# Patient Record
Sex: FEMALE | URBAN - METROPOLITAN AREA
[De-identification: names, ages, dates, MRNs, and addresses within clinical notes are randomized per-mention and may not be internally consistent; named-entity substitution may affect disease eponyms.]

---

## 2017-06-21 ENCOUNTER — IMPORTED ENCOUNTER (OUTPATIENT)
Dept: URBAN - METROPOLITAN AREA CLINIC 9 | Facility: CLINIC | Age: 57
End: 2017-06-21

## 2019-01-25 NOTE — PATIENT DISCUSSION
CATARACTS, OU- VISUALLY SIGNIFICANT. PT ELECTS TO PROCEED WITH SURGERY. SCHEDULE OD EYE FIRST THEN LATER IN THE OS EYE IF VISUAL SYMPTOMS PERSIST.

## 2019-01-25 NOTE — PATIENT DISCUSSION
Gave patient trial lens OD -1.50-0.75x100 Acuvue Oasys for Astigmatism just to try. 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

## 2019-01-31 NOTE — PATIENT DISCUSSION
RIGHT EYE CATARACT SURGERY - PRE-OP INSTRUCTIONS:  I have reviewed the indications, alternatives, and risks of the procedure with the patient. These risks include partial or total loss of vision, infection in the eye, pain, and the need for additional surgery for complications including a broken posterior capsule, inability to completely remove the cataract, dropped nucleus, infection, or retinal detachment. The patient has given informed consent by signing the standard consent form. We will proceed with cataract surgery as planned. The patient has also been instructed in the usual pre-operative regimen including the need to use Prolensa and Besivance 3 days prior to surgery or the alternatives of Ketorolac and Ofloxacin, and the need to avoid eating or drinking anything except prescription medications after midnight on the morning of surgery. The patient was given the pre-printed Cataract Surgery Instructions handout. The contents were carefully reviewed with the patient.

## 2019-01-31 NOTE — PATIENT DISCUSSION
Surgery Counseling:  I have discussed the option of glasses versus cataract surgery versus following, It was explained that when vision no longer meets the patient's visual needs and a new prescription for glasses is not likely to improve the patient's visual symptoms, the option of cataract surgery is a reasonable next step. It was explained that there is no guarantee that removing the cataract will improve their visual symptoms; however, it is believed that the cataract is contributing to the patient's visual impairment and surgery may noticeably improve both the visual and functional status of the patient. After this discussion, the patient desires to proceed with cataract surgery with implantation of an intraocular lens to improve their vision for DISTANCE. I have prescribed Prolensa, Besivance, and Durezol to use as directed. Approved substitution of generics are Ofloxacin, Ketorolac, and Prednisolone.

## 2019-02-12 NOTE — PATIENT DISCUSSION
Continue: prednisolone acetate (prednisolone acetate): drops,suspension: 1% 1 drop four times a day into right eye 01-

## 2019-02-19 NOTE — PATIENT DISCUSSION
Pre-Op 2nd Eye Counseling: The patient has noticed an improvement in their visual symptoms in the operative eye. The patient complains of decreased vision in the fellow eye when distance and not as clear at near as he would like it to be. It was explained to the patient that the decision to proceed with cataract surgery in the fellow eye is entirely a separate decision from the surgical eye. All of the same risks, benefits and alternatives were reviewed with the patient again. The patient does feel the vision in the non-operative eye is limiting their daily activities and elects to proceed with cataract surgery in the left  eye.

## 2019-04-09 NOTE — PATIENT DISCUSSION
S/P PSEUDOPHAKIA OU: FINAL POST OP APPOINTMENT. PATIENT HAS COMPLETED ALL POST-OP DROPS AND WILL RETURN FOR REGULAR FOLLOW UPS. PATIENT WAS DILATED AND WILL RECEIVE A COPY OF GLASSES PRESCRIPTION, THEY MAY FILL AS DESIRED.

## 2019-12-05 ENCOUNTER — IMPORTED ENCOUNTER (OUTPATIENT)
Dept: URBAN - METROPOLITAN AREA CLINIC 9 | Facility: CLINIC | Age: 59
End: 2019-12-05

## 2020-01-14 NOTE — PATIENT DISCUSSION
S/P PSEUDOPHAKIA OU: FINAL POST OP APPOINTMENT. PATIENT HAS COMPLETED ALL POST-OP DROPS AND WILL RETURN FOR REGULAR FOLLOW UPS.

## 2021-10-18 ASSESSMENT — VISUAL ACUITY
OD_CC: 20/50 -2 SN
OD_CC: 20/20 - SN
OS_CC: 20/40 -2 SN

## 2021-10-18 ASSESSMENT — TONOMETRY
OD_IOP_MMHG: 14
OS_IOP_MMHG: 14
OD_IOP_MMHG: 12
OS_IOP_MMHG: 14

## 2021-11-09 NOTE — PATIENT DISCUSSION
(Monitor) Discussed drooping upper eyelids with patient. Patient not bothered at this time. Will continue to monitor and can pursue surgical intervention if becomes indicated.

## 2022-07-01 RX ORDER — FLUOXETINE HYDROCHLORIDE 20 MG/1
CAPSULE ORAL
COMMUNITY
End: 2022-09-09

## 2022-07-01 RX ORDER — LEVOTHYROXINE SODIUM 0.05 MG/1
TABLET ORAL
COMMUNITY
End: 2022-08-19

## 2023-02-21 NOTE — PATIENT DISCUSSION
Report received from Freddie VILA in green. Pt is resting comfortably and reports improved breathing. Awaiting dispo start immediately after surgery in operative eye only